# Patient Record
Sex: FEMALE | Race: WHITE | Employment: FULL TIME | URBAN - NONMETROPOLITAN AREA
[De-identification: names, ages, dates, MRNs, and addresses within clinical notes are randomized per-mention and may not be internally consistent; named-entity substitution may affect disease eponyms.]

---

## 2024-04-22 ENCOUNTER — HOSPITAL ENCOUNTER (OUTPATIENT)
Dept: PHYSICAL THERAPY | Age: 64
Setting detail: THERAPIES SERIES
Discharge: HOME | End: 2024-04-22
Payer: MEDICARE

## 2024-04-22 PROCEDURE — 97162 PT EVAL MOD COMPLEX 30 MIN: CPT

## 2024-04-22 ASSESSMENT — PAIN SCALES - GENERAL: PAINLEVEL_OUTOF10: 3

## 2024-04-22 NOTE — PLAN OF CARE
[facility]       Phone: [phone]   Date: 2024                      Outpatient Physical Therapy  Fax: [phone]    ACCT #: [id number]                     Plan of Care  [facility]#: [id number]  Patient: Glenis Glasgow  : 1960    Referring Provider (secondary): Kayy Early CNP    Diagnosis: Low Back pain  Onset Date: 24  Treatment Diagnosis: Low back Pain    Assessment  Body Structures, Functions, Activity Limitations Requiring Skilled Therapeutic Intervention: Decreased functional mobility , Decreased ADL status, Decreased ROM, Decreased strength, Decreased endurance, Decreased balance, Decreased high-level IADLs, Increased pain, Decreased posture  Assessment: Pt to benefit from ther ex for ROM and core strengthening to reduce LBP and improve activity dayanna. Pt to also benefit from neuro re-ed for balance/proprioceptive training to improve outdoor amb and stairs.  Pt reports prenisone has decreased her pain significantly but is concerned it may return.  Therapy Prognosis: Good    Treatment Plan   Days: 2 times per week Weeks: 5 weeks Total # of Visits Approved: 10    Patient Education/HEP, Back Education, Therapeutic Exercise, Manual Therapy: Myofacial Release/Cupping, Manual Therapy: Mobilization/Manipulation, Neuro Re-ed, Traction, and HP/CP     Goals  Short Term Goals  Time Frame for Short Term Goals: 5 visits  Short Term Goal 1: Pt to report independence and compliance with HEP for ROM and lumbar decompression  Long Term Goals  Time Frame for Long Term Goals : 10 visits (POC exp 24)  Long Term Goal 1: Pt to improve Oswestry from 17/50 to less than 12/50 to improve ADL dayanna.  Long Term Goal 2: Pt to report worst pain 2/10 x3 consecutive days to improve ADL and Sleep dayanna  Long Term Goal 3: Pt to have 4/5 hip ABD strength to improve walking/standing dayanna  Long Term Goal 4: Pt to have 4/5 horiz ABD strength with MMT to improve posture     Yvette Bhatia, PT

## 2024-04-22 NOTE — THERAPY EVALUATION
Phone: [phone]                       [facility]         Fax: [phone]                      Outpatient Physical Therapy                                                                      Evaluation    Date: 2024  Patient: Glenis Glasgow  : 1960  ACCT #: [id number]    Referring Provider (secondary): Kayy Early CNP    Diagnosis: Low Back pain    Treatment Diagnosis: Low back Pain  Onset Date: 24  Total # of Visits Approved: 10 Per Physician Order  Total # of Visits to Date: 1  No Show: 0  Canceled Appointment: 0     Subjective     Additional Pertinent Hx: Pt reports history of back pain and hip pain for years(on and off).  Pt reports history of heel spurs with surgery.  Pt reports predinsone has helped significantly, not currently having radicular pain in L post thigh.  Pain is more leftsided now, was right sided initially.  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  Xray: Mild-to-moderate degenerative disc disease and facet arthropathy is present throughout the lumbar spine  Prior to prenisone has posterior thigh referral of pain(sciatic).  Sleeping still a challenge even after prenisone.  Stairs are still a problem, at times crawls up them.  Pt reports orthotic wear(1yr old).  Pain Assessment  Pain Level: 3          Objective     Observation/Palpation  Posture: Fair  Observation: Reflexes: B/L WNL achilles and patellar tendon  Spine  Cervical: Flexion=full, Extension 75% low back discomfort, Sidebending: R full, L 75% painful on R, Rotation: B/L WNL  Special Tests: Trunk/core strength 4-/5 B/L  Strength RLE  R Hip Flexion: 3+/5  R Hip Extension: 3+/5  R Hip ABduction: 3+/5  R Hip ADduction: 4+/5  R Knee Extension: 4+/5  R Ankle Dorsiflexion: 5/5  AROM RLE (degrees)  R Hip External Rotation (0-45): 45deg  R Hip Internal Rotation (0-45): 0  Strength LLE  L Hip Flexion: 3+/5  L Hip Extension: 3+/5  L Hip ABduction: 3+/5  L Hip ADduction: 4+/5  L

## 2024-04-25 ENCOUNTER — HOSPITAL ENCOUNTER (OUTPATIENT)
Dept: PHYSICAL THERAPY | Age: 64
Setting detail: THERAPIES SERIES
Discharge: HOME | End: 2024-04-25
Payer: MEDICARE

## 2024-04-25 PROCEDURE — 97110 THERAPEUTIC EXERCISES: CPT

## 2024-04-25 PROCEDURE — 97140 MANUAL THERAPY 1/> REGIONS: CPT

## 2024-04-25 NOTE — PROGRESS NOTES
Phone: [phone]                 [facility]      Fax: [phone]                            Outpatient Physical Therapy                                                                            Daily Note    Date: 2024  Patient Name: Glenis Glasgow        MRN: [id number]   ACCT#:  [id number]  : 1960  (64 y.o.)    Referring Provider (secondary): Kayy Early CNP         Diagnosis: Low Back pain  Treatment Diagnosis: Low back Pain    Onset Date: 24  Total # of Visits Approved: 10 Per Physician Order  Total # of Visits to Date: 2  No Show: 0  Canceled Appointment: 0  Plan of Care/Certification Expiration Date: 24    Pre-Treatment Pain:  2/10     Assessment  Assessment: Patient rates pain today as 2/10.  Reports compliance with current HEP but does have some questions which are answered by PTA.  Progressed with exercises as outlined above with good tolerance.  Concluded session with manual.  No pain following session.    Plan  Continue with current plan of care    Exercises/Modalities/Manual:  See DocFlow Sheet    Education:           Goals  (Total # of Visits to Date: 2)   Short Term Goals  Time Frame for Short Term Goals: 5 visits  Short Term Goal 1: Pt to report independence and compliance with HEP for ROM and lumbar decompression    Long Term Goals  Time Frame for Long Term Goals : 10 visits (POC exp 24)  Long Term Goal 1: Pt to improve Oswestry from 17/50 to less than 12/50 to improve ADL dayanna.  Long Term Goal 2: Pt to report worst pain 2/10 x3 consecutive days to improve ADL and Sleep dayanna  Long Term Goal 3: Pt to have 4/5 hip ABD strength to improve walking/standing dayanna  Long Term Goal 4: Pt to have 4/5 horiz ABD strength with MMT to improve posture    Post Treatment Pain:  0/10    Time In: 1029    Time Out : 1109        Timed Code Treatment Minutes: 40 Minutes  Total Treatment Time: 40  Minutes    Dara White PTA     Date: 2024

## 2024-04-29 ENCOUNTER — HOSPITAL ENCOUNTER (OUTPATIENT)
Dept: PHYSICAL THERAPY | Age: 64
Setting detail: THERAPIES SERIES
Discharge: HOME | End: 2024-04-29
Payer: MEDICARE

## 2024-04-29 PROCEDURE — 97140 MANUAL THERAPY 1/> REGIONS: CPT

## 2024-04-29 PROCEDURE — 97110 THERAPEUTIC EXERCISES: CPT

## 2024-04-29 ASSESSMENT — PAIN SCALES - GENERAL: PAINLEVEL_OUTOF10: 2

## 2024-04-29 NOTE — PROGRESS NOTES
Phone: [phone]                 [facility]      Fax: [phone]                            Outpatient Physical Therapy                                                                            Daily Note    Date: 2024  Patient Name: Glenis Glasgow        MRN: [id number]   ACCT#:  [id number]  : 1960  (64 y.o.)    Referring Provider (secondary): Kayy Early CNP         Diagnosis: Low Back pain  Treatment Diagnosis: Low back Pain    Onset Date: 24  Total # of Visits Approved: 10 Per Physician Order  Total # of Visits to Date: 3  No Show: 0  Canceled Appointment: 0  Plan of Care/Certification Expiration Date: 24    Pre-Treatment Pain:  2/10     Assessment  Assessment: The past several evenings pain increased to 7/10 without explaination.  Pt reports she hadnt had that pain for a while. Pt reports she was active in her yard with increase raking/weed eating etc.  Pt requires cues for hip exercise to complete without compensation of lumbar.  Pt with good dayanna to ther ex this date.  Post manual pt reports no c/o pain.  Will monitor pt dayanna, if no noted improvement in next 1-2visits will likely initiate lumbar traction.    Plan  Continue with current plan of care    Exercises/Modalities/Manual:  See DocFlow Sheet    Education: Proper form with Tband hip ex's          Goals  (Total # of Visits to Date: 3)   Short Term Goals  Time Frame for Short Term Goals: 5 visits  Short Term Goal 1: Pt to report independence and compliance with HEP for ROM and lumbar decompression    Long Term Goals  Time Frame for Long Term Goals : 10 visits (POC exp 24)  Long Term Goal 1: Pt to improve Oswestry from 17/50 to less than 12/50 to improve ADL dayanna.  Long Term Goal 2: Pt to report worst pain 2/10 x3 consecutive days to improve ADL and Sleep dayanna  Long Term Goal 3: Pt to have 4/5 hip ABD strength to improve walking/standing dayanna  Long Term Goal 4: Pt to have 4/5 horiz ABD strength with MMT to

## 2024-05-06 ENCOUNTER — HOSPITAL ENCOUNTER (OUTPATIENT)
Dept: PHYSICAL THERAPY | Age: 64
Setting detail: THERAPIES SERIES
Discharge: HOME | End: 2024-05-06
Payer: MEDICARE

## 2024-05-06 PROCEDURE — 97110 THERAPEUTIC EXERCISES: CPT

## 2024-05-06 PROCEDURE — 97140 MANUAL THERAPY 1/> REGIONS: CPT

## 2024-05-06 NOTE — PROGRESS NOTES
Phone: [phone]                 [facility]      Fax: [phone]                            Outpatient Physical Therapy                                                                            Daily Note    Date: 2024  Patient Name: Glenis Glasgow        MRN: [id number]   ACCT#:  [id number]  : 1960  (64 y.o.)    Referring Provider (secondary): Kayy Early CNP         Diagnosis: Low Back pain  Treatment Diagnosis: Low back Pain    Onset Date: 24  Total # of Visits Approved: 10 Per Physician Order  Total # of Visits to Date: 4  No Show: 0  Canceled Appointment: 0  Plan of Care/Certification Expiration Date: 24    Pre-Treatment Pain:  0/10     Assessment  Assessment: Pt reports yesterday pain reached 7/10 in back with mowing and helping her son turn over. Pain typicallyincreases as the day goes.  Pt reports stretching(HEP) eleviated pain. Access Code: [email or web address]  URL: [email or web address]  Date: 2024  Prepared by: Yvette Bhatia    Exercises  - Standing Anti-Rotation Press with Anchored Resistance  - 2 x daily - 7 x weekly - 2 sets - 10 reps  - Standing Bilateral Low Shoulder Row with Anchored Resistance  - 2 x daily - 7 x weekly - 2 sets - 10 reps  - Side Stepping with Resistance at Ankles  - 2 x daily - 7 x weekly - 2 sets - 10 reps  - Hip Extension with Resistance Loop  - 2 x daily - 7 x weekly - 2 sets - 10 reps    Plan  Continue with current plan of care    Exercises/Modalities/Manual:  See DocFlow Sheet    Education: see assessment          Goals  (Total # of Visits to Date: 4)   Short Term Goals  Time Frame for Short Term Goals: 5 visits  Short Term Goal 1: Pt to report independence and compliance with HEP for ROM and lumbar decompression-MET    Long Term Goals  Time Frame for Long Term Goals : 10 visits (POC exp 24)  Long Term Goal 1: Pt to improve Oswestry from 17/50 to less than 12/50 to improve ADL dayanna.  Long Term Goal 2: Pt to

## 2024-05-09 ENCOUNTER — HOSPITAL ENCOUNTER (OUTPATIENT)
Dept: PHYSICAL THERAPY | Age: 64
Setting detail: THERAPIES SERIES
Discharge: HOME | End: 2024-05-09
Payer: MEDICARE

## 2024-05-09 PROCEDURE — 97140 MANUAL THERAPY 1/> REGIONS: CPT

## 2024-05-09 PROCEDURE — 97110 THERAPEUTIC EXERCISES: CPT

## 2024-05-09 NOTE — PROGRESS NOTES
Phone: [phone]                 [facility]      Fax: [phone]                            Outpatient Physical Therapy                                                                            Daily Note    Date: 2024  Patient Name: Glenis Glasgow        MRN: [id number]   ACCT#:  [id number]  : 1960  (64 y.o.)    Referring Provider (secondary): Kayy Early CNP         Diagnosis: Low Back pain  Treatment Diagnosis: Low back Pain    Onset Date: 24  Total # of Visits Approved: 10 Per Physician Order  Total # of Visits to Date: 5  No Show: 0  Canceled Appointment: 0  Plan of Care/Certification Expiration Date: 24    Pre-Treatment Pain:  0/10     Assessment  Assessment: Pt is pleased with progress and denies pain this morning. Pt is 75% improved and able to do things such as weed eating which she couldn't do before. Perfromed ex as outlined for postural strength and flexibility. Manual therapy for soft tissue release and joint mobility. No pain after session. Instructed in heat/ice as needed.    Plan  Continue with current plan of care    Exercises/Modalities/Manual:  See DocFlow Sheet    Education: see assessment          Goals  (Total # of Visits to Date: 5)   Short Term Goals  Time Frame for Short Term Goals: 5 visits  Short Term Goal 1: Pt to report independence and compliance with HEP for ROM and lumbar decompression-MET    Long Term Goals  Time Frame for Long Term Goals : 10 visits (POC exp 24)  Long Term Goal 1: Pt to improve Oswestry from 17/50 to less than 12/50 to improve ADL dayanna.  Long Term Goal 2: Pt to report worst pain 2/10 x3 consecutive days to improve ADL and Sleep dayanna  Long Term Goal 3: Pt to have 4/5 hip ABD strength to improve walking/standing dayanna  Long Term Goal 4: Pt to have 4/5 horiz ABD strength with MMT to improve posture    Post Treatment Pain:  0/10    Time In: 1030    Time Out : 1115   Timed and total 45 min    Anette Lea  PTA   Date:

## 2024-05-13 ENCOUNTER — HOSPITAL ENCOUNTER (OUTPATIENT)
Dept: PHYSICAL THERAPY | Age: 64
Setting detail: THERAPIES SERIES
Discharge: HOME | End: 2024-05-13
Payer: MEDICARE

## 2024-05-13 PROCEDURE — 97140 MANUAL THERAPY 1/> REGIONS: CPT

## 2024-05-13 PROCEDURE — 97110 THERAPEUTIC EXERCISES: CPT

## 2024-05-13 ASSESSMENT — PAIN SCALES - GENERAL: PAINLEVEL_OUTOF10: 6

## 2024-05-13 NOTE — PROGRESS NOTES
Phone: [phone]                 [facility]      Fax: [phone]                            Outpatient Physical Therapy                                                                            Daily Note    Date: 2024  Patient Name: Glenis Glasgow        MRN: [id number]   ACCT#:  [id number]  : 1960  (64 y.o.)    Referring Provider (secondary): Kayy Early CNP         Diagnosis: Low Back pain  Treatment Diagnosis: Low back Pain    Onset Date: 24  Total # of Visits Approved: 10 Per Physician Order  Total # of Visits to Date: 6  No Show: 0  Canceled Appointment: 0  Plan of Care/Certification Expiration Date: 24    Pre-Treatment Pain:  6/10     Assessment  Assessment: Pt Reports she felt good the day of last visit. She notes she had increased pain with dry mopping when she turned. Pain today 5-6/10 with certain movements. Performed ex as outlined, progressed Nustep to level 2 x 6 min . Manual therapy for soft tissue release and joing mobility. After session pain 3/10. May consider traction.    Plan  Continue with current plan of care    Exercises/Modalities/Manual:  See DocFlow Sheet    Education: ex progression          Goals  (Total # of Visits to Date: 6)   Short Term Goals  Time Frame for Short Term Goals: 5 visits  Short Term Goal 1: Pt to report independence and compliance with HEP for ROM and lumbar decompression-MET    Long Term Goals  Time Frame for Long Term Goals : 10 visits (POC exp 24)  Long Term Goal 1: Pt to improve Oswestry from 17/50 to less than 12/50 to improve ADL dayanna.  Long Term Goal 2: Pt to report worst pain 2/10 x3 consecutive days to improve ADL and Sleep dayanna  Long Term Goal 3: Pt to have 4/5 hip ABD strength to improve walking/standing dayanna  Long Term Goal 4: Pt to have 4/5 horiz ABD strength with MMT to improve posture    Post Treatment Pain:  3/10    Time In: 1310    Time Out : 1350     Timed and total 40 min    Anette Lea PTA

## 2024-05-15 ENCOUNTER — HOSPITAL ENCOUNTER (OUTPATIENT)
Dept: PHYSICAL THERAPY | Age: 64
Setting detail: THERAPIES SERIES
Discharge: HOME | End: 2024-05-15
Payer: MEDICARE

## 2024-05-15 PROCEDURE — 97012 MECHANICAL TRACTION THERAPY: CPT

## 2024-05-15 PROCEDURE — 97110 THERAPEUTIC EXERCISES: CPT

## 2024-05-15 ASSESSMENT — PAIN SCALES - GENERAL: PAINLEVEL_OUTOF10: 2

## 2024-05-15 NOTE — PROGRESS NOTES
Phone: [phone]                 [facility]      Fax: [phone]                            Outpatient Physical Therapy                                                                            Daily Note    Date: 5/15/2024  Patient Name: Glenis Glasgow        MRN: [id number]   ACCT#:  [id number]  : 1960  (64 y.o.)    Referring Provider (secondary): Kayy Early CNP         Diagnosis: Low Back pain  Treatment Diagnosis: Low back Pain    Onset Date: 24  Total # of Visits Approved: 10 Per Physician Order  Total # of Visits to Date: 7  No Show: 0  Canceled Appointment: 0  Plan of Care/Certification Expiration Date: 24    Pre-Treatment Pain:  2/10     Assessment  Assessment: Pt rates pain 2/10 today.She notes pain eased yesterday. She is compliant with HEP. Per PT consult modified treatment to ex warm up followed by HP 40# pelvic traction with good dayanna. May increase traction if indicated.    Plan  Continue with current plan of care    Exercises/Modalities/Manual:  See DocFlow Sheet    Education: traction          Goals  (Total # of Visits to Date: 7)   Short Term Goals  Time Frame for Short Term Goals: 5 visits  Short Term Goal 1: Pt to report independence and compliance with HEP for ROM and lumbar decompression-MET    Long Term Goals  Time Frame for Long Term Goals : 10 visits (POC exp 24)  Long Term Goal 1: Pt to improve Oswestry from 17/50 to less than 12/50 to improve ADL dayanna.  Long Term Goal 2: Pt to report worst pain 2/10 x3 consecutive days to improve ADL and Sleep dayanna  Long Term Goal 3: Pt to have 4/5 hip ABD strength to improve walking/standing dayanna  Long Term Goal 4: Pt to have 4/5 horiz ABD strength with MMT to improve posture    Post Treatment Pain:  3-4/10    Time In: 1045    Time Out : 1115        Timed Code Treatment Minutes: 15 Minutes  Total Treatment Time: 30 Minutes    Anette Lea PTA    Date: 5/15/2024

## 2024-05-16 ENCOUNTER — APPOINTMENT (OUTPATIENT)
Dept: PHYSICAL THERAPY | Age: 64
End: 2024-05-16
Payer: COMMERCIAL

## 2024-05-20 ENCOUNTER — HOSPITAL ENCOUNTER (OUTPATIENT)
Dept: PHYSICAL THERAPY | Age: 64
Setting detail: THERAPIES SERIES
Discharge: HOME | End: 2024-05-20
Payer: MEDICARE

## 2024-05-20 NOTE — PROGRESS NOTES
Physical Therapy  [facility]  Rehab and Wellness    Date: 2024  Patient Name: Glenis Glasgow        : 1960     Patient called to cancel appt., has a doctors appt at the same time.         Vibha Fu Date: 2024

## 2024-05-23 ENCOUNTER — HOSPITAL ENCOUNTER (OUTPATIENT)
Dept: PHYSICAL THERAPY | Age: 64
Setting detail: THERAPIES SERIES
Discharge: HOME | End: 2024-05-23
Payer: MEDICARE

## 2024-05-23 NOTE — PROGRESS NOTES
Physical Therapy  [facility]  Rehab and Wellness    Date: 2024  Patient Name: Glenis Glasgow        : 1960       Patient called and her son is in the ER and she is not able to make this appointment.  She will return on her next appointment.      Edie CHAMBERS Shock Date: 2024

## 2024-06-03 ENCOUNTER — HOSPITAL ENCOUNTER (OUTPATIENT)
Dept: PHYSICAL THERAPY | Age: 64
Setting detail: THERAPIES SERIES
Discharge: HOME | End: 2024-06-03
Payer: COMMERCIAL

## 2024-06-03 PROCEDURE — 97110 THERAPEUTIC EXERCISES: CPT

## 2024-06-03 ASSESSMENT — PAIN SCALES - GENERAL: PAINLEVEL_OUTOF10: 2

## 2024-06-03 NOTE — DISCHARGE SUMMARY
Phone: [phone]                 [facility]             Fax: [phone]                            Outpatient Physical Therapy                                                                    Discharge Summary    Patient: Glenis Glasgow  : 1960  ACCT #: [id number]   Referring Provider (secondary): Kayy Early CNP      Diagnosis: Low Back pain    Date Treatment Initiated: 24  Date of Last Treatment: 24    PT Visit Information  Onset Date: 24  Total # of Visits Approved: 15  Total # of Visits to Date: 8  Plan of Care/Certification Expiration Date: 24  No Show: 0  Canceled Appointment: 0  Referring Provider (secondary): Kayy Early CNP    Frequency/Duration  Days: 2 times per week  Weeks: 4 weeks    Treatment Received  Patient Education/HEP, Back Education, Therapeutic Exercise, Manual Therapy: Myofacial Release/Cupping, Manual Therapy: Mobilization/Manipulation, Traction, and HP/CP    Pain Level: 2    Assessment  Assessment: Pt reports pain continues to fluctuate in severity and location depending on weather and activity.  Pt states she feels she has plateaued and until other health issues such as her feet are resolved she is at her best at this time.  Pain at worst in the last week 6/10.  Horiz ABD=4/5,  Hip Ext and ABD= 4+/5 B/L, Flexion=4/5 B/L, Trunk rotation: 4/5 B/L,  Oswestry=7/50(significantly improved from IE)  Will D/C to HEP at this time.    Goals  Long Term Goals  Time Frame for Long Term Goals : 15 visits (POC exp 24)  Long Term Goal 1: Pt to improve Oswestry from 17/50 to less than 12/50 to improve ADL dayanna.-MET 7/50  Long Term Goal 2: Pt to report worst pain 2/10 x3 consecutive days to improve ADL and Sleep dayanna-NOT MET  Long Term Goal 3: Pt to have 4/5 hip ABD strength to improve walking/standing dayanna- MET  Long Term Goal 4: Pt to have 4/5 horiz ABD strength with MMT to improve posture-MET    Reason for Discharge  Optimal Function

## 2024-06-03 NOTE — PROGRESS NOTES
Phone: [phone]                 [facility]      Fax: [phone]                            Outpatient Physical Therapy                                                                            Daily Note    Date: 6/3/2024  Patient Name: Glenis Glasgow        MRN: [id number]   ACCT#:  [id number]  : 1960  (64 y.o.)    Referring Provider (secondary): Kayy Early CNP         Diagnosis: Low Back pain  Treatment Diagnosis: Low back Pain    Onset Date: 24  Total # of Visits Approved: 15 Per Physician Order  Total # of Visits to Date: 8  No Show: 0  Canceled Appointment: 0  Plan of Care/Certification Expiration Date: 24    Pre-Treatment Pain:  2/10     Assessment  Assessment: Pt reports pain continues to fluctuate in severity and location depending on weather and activity.  Pt states she feels she has plateaued and until other health issues such as her feet are resolved she is at her best at this time.  Pain at worst in the last week 6/10.  Horiz ABD=4/5,  Hip Ext and ABD= 4+/5 B/L, Flexion=4/5 B/L, Trunk rotation: 4/5 B/L,  Oswestry=7/50(significantly improved from IE)  Will D/C to HEP at this time.  Pt reports no change post traction last visit, pt wishes to hold additional traction as she feels well at this time.      Plan  Discharge    Exercises/Modalities/Manual:  See DocFlow Sheet    Education: Cont with HEP for maintenance, if pain persists after footcare pt can request additional PT at that time.          Goals  (Total # of Visits to Date: 8)        Long Term Goals  Time Frame for Long Term Goals : 15 visits (POC exp 24)  Long Term Goal 1: Pt to improve Oswestry from 17/50 to less than 12/50 to improve ADL dayanna.-MET 7/50  Long Term Goal 2: Pt to report worst pain 2/10 x3 consecutive days to improve ADL and Sleep dayanna-NOT MET  Long Term Goal 3: Pt to have 4/5 hip ABD strength to improve walking/standing dayanna- MET  Long Term Goal 4: Pt to have 4/5 horiz ABD strength with

## 2025-02-17 ENCOUNTER — HOSPITAL ENCOUNTER (EMERGENCY)
Age: 65
Discharge: HOME | End: 2025-02-17
Attending: EMERGENCY MEDICINE
Payer: MEDICARE

## 2025-02-17 VITALS
OXYGEN SATURATION: 98 % | BODY MASS INDEX: 36.32 KG/M2 | RESPIRATION RATE: 20 BRPM | SYSTOLIC BLOOD PRESSURE: 172 MMHG | HEART RATE: 96 BPM | TEMPERATURE: 98.2 F | WEIGHT: 205 LBS | DIASTOLIC BLOOD PRESSURE: 92 MMHG | HEIGHT: 63 IN

## 2025-02-17 DIAGNOSIS — H66.011 ACUTE SUPPURATIVE OTITIS MEDIA OF RIGHT EAR WITH SPONTANEOUS RUPTURE OF TYMPANIC MEMBRANE, RECURRENCE NOT SPECIFIED: Primary | ICD-10-CM

## 2025-02-17 PROCEDURE — 99283 EMERGENCY DEPT VISIT LOW MDM: CPT

## 2025-02-17 RX ORDER — BROMPHENIRAMINE MALEATE, PSEUDOEPHEDRINE HYDROCHLORIDE, AND DEXTROMETHORPHAN HYDROBROMIDE 2; 30; 10 MG/5ML; MG/5ML; MG/5ML
5 SYRUP ORAL 4 TIMES DAILY PRN
Qty: 100 ML | Refills: 0 | Status: SHIPPED | OUTPATIENT
Start: 2025-02-17 | End: 2025-02-22

## 2025-02-17 RX ORDER — AMOXICILLIN 500 MG/1
500 CAPSULE ORAL 3 TIMES DAILY
Qty: 30 CAPSULE | Refills: 0 | Status: SHIPPED | OUTPATIENT
Start: 2025-02-17 | End: 2025-02-27

## 2025-02-17 RX ORDER — ECHINACEA PURPUREA EXTRACT 125 MG
1 TABLET ORAL PRN
Qty: 1 EACH | Refills: 0 | Status: SHIPPED | OUTPATIENT
Start: 2025-02-17

## 2025-02-17 ASSESSMENT — PAIN DESCRIPTION - LOCATION: LOCATION: EAR

## 2025-02-17 ASSESSMENT — LIFESTYLE VARIABLES
HOW MANY STANDARD DRINKS CONTAINING ALCOHOL DO YOU HAVE ON A TYPICAL DAY: PATIENT DOES NOT DRINK
HOW OFTEN DO YOU HAVE A DRINK CONTAINING ALCOHOL: NEVER

## 2025-02-17 ASSESSMENT — PAIN DESCRIPTION - DESCRIPTORS: DESCRIPTORS: ACHING

## 2025-02-17 ASSESSMENT — PAIN DESCRIPTION - ORIENTATION: ORIENTATION: RIGHT

## 2025-02-17 ASSESSMENT — PAIN SCALES - GENERAL: PAINLEVEL_OUTOF10: 4

## 2025-02-17 ASSESSMENT — PAIN - FUNCTIONAL ASSESSMENT: PAIN_FUNCTIONAL_ASSESSMENT: 0-10
